# Patient Record
Sex: MALE | Race: WHITE | NOT HISPANIC OR LATINO | Employment: UNEMPLOYED | URBAN - METROPOLITAN AREA
[De-identification: names, ages, dates, MRNs, and addresses within clinical notes are randomized per-mention and may not be internally consistent; named-entity substitution may affect disease eponyms.]

---

## 2024-01-05 ENCOUNTER — LAB (OUTPATIENT)
Dept: LAB | Facility: LAB | Age: 19
End: 2024-01-05
Payer: COMMERCIAL

## 2024-01-05 ENCOUNTER — OFFICE VISIT (OUTPATIENT)
Dept: PEDIATRIC ENDOCRINOLOGY | Facility: CLINIC | Age: 19
End: 2024-01-05
Payer: COMMERCIAL

## 2024-01-05 VITALS
BODY MASS INDEX: 21.45 KG/M2 | HEART RATE: 82 BPM | HEIGHT: 74 IN | RESPIRATION RATE: 18 BRPM | SYSTOLIC BLOOD PRESSURE: 122 MMHG | DIASTOLIC BLOOD PRESSURE: 85 MMHG | WEIGHT: 167.11 LBS | OXYGEN SATURATION: 96 %

## 2024-01-05 DIAGNOSIS — E10.9 TYPE 1 DIABETES MELLITUS WITH HEMOGLOBIN A1C GOAL OF LESS THAN 7.0% (MULTI): ICD-10-CM

## 2024-01-05 LAB
ALBUMIN SERPL-MCNC: 4.2 G/DL (ref 3.5–5)
ALP BLD-CCNC: 141 U/L (ref 35–125)
ALT SERPL-CCNC: 90 U/L (ref 5–40)
ANION GAP SERPL CALC-SCNC: 11 MMOL/L
AST SERPL-CCNC: 65 U/L (ref 5–40)
BILIRUB SERPL-MCNC: 0.3 MG/DL (ref 0.1–1.2)
BUN SERPL-MCNC: 13 MG/DL (ref 8–25)
CALCIUM SERPL-MCNC: 9.5 MG/DL (ref 8.5–10.4)
CHLORIDE SERPL-SCNC: 101 MMOL/L (ref 97–107)
CHOLEST SERPL-MCNC: 184 MG/DL (ref 0–199)
CHOLESTEROL/HDL RATIO: 3.8
CO2 SERPL-SCNC: 29 MMOL/L (ref 24–31)
CREAT SERPL-MCNC: 0.7 MG/DL (ref 0.4–1.6)
EST. AVERAGE GLUCOSE BLD GHB EST-MCNC: 235 MG/DL
GFR SERPL CREATININE-BSD FRML MDRD: >90 ML/MIN/1.73M*2
GLUCOSE SERPL-MCNC: 241 MG/DL (ref 65–99)
HBA1C MFR BLD: 9.8 %
HDLC SERPL-MCNC: 48.6 MG/DL
NON-HDL CHOLESTEROL: 135 MG/DL (ref 0–119)
POC HEMOGLOBIN A1C: 9.4 % (ref 4.2–6.5)
POTASSIUM SERPL-SCNC: 4.4 MMOL/L (ref 3.4–5.1)
PROT SERPL-MCNC: 6.4 G/DL (ref 5.9–7.9)
SODIUM SERPL-SCNC: 141 MMOL/L (ref 133–145)
T4 FREE SERPL-MCNC: 1.4 NG/DL (ref 0.9–1.7)
TSH SERPL DL<=0.05 MIU/L-ACNC: 4.66 MIU/L (ref 0.27–4.2)

## 2024-01-05 PROCEDURE — 82465 ASSAY BLD/SERUM CHOLESTEROL: CPT

## 2024-01-05 PROCEDURE — 99215 OFFICE O/P EST HI 40 MIN: CPT | Performed by: PEDIATRICS

## 2024-01-05 PROCEDURE — 36415 COLL VENOUS BLD VENIPUNCTURE: CPT

## 2024-01-05 PROCEDURE — 83036 HEMOGLOBIN GLYCOSYLATED A1C: CPT

## 2024-01-05 PROCEDURE — 1036F TOBACCO NON-USER: CPT | Performed by: PEDIATRICS

## 2024-01-05 PROCEDURE — 3079F DIAST BP 80-89 MM HG: CPT | Performed by: PEDIATRICS

## 2024-01-05 PROCEDURE — 83718 ASSAY OF LIPOPROTEIN: CPT

## 2024-01-05 PROCEDURE — 83516 IMMUNOASSAY NONANTIBODY: CPT

## 2024-01-05 PROCEDURE — 80053 COMPREHEN METABOLIC PANEL: CPT

## 2024-01-05 PROCEDURE — 84439 ASSAY OF FREE THYROXINE: CPT

## 2024-01-05 PROCEDURE — 3074F SYST BP LT 130 MM HG: CPT | Performed by: PEDIATRICS

## 2024-01-05 PROCEDURE — 84443 ASSAY THYROID STIM HORMONE: CPT

## 2024-01-05 RX ORDER — INSULIN LISPRO 100 [IU]/ML
INJECTION, SOLUTION INTRAVENOUS; SUBCUTANEOUS
COMMUNITY
Start: 2019-01-16 | End: 2024-01-05 | Stop reason: WASHOUT

## 2024-01-05 RX ORDER — INSULIN ASPART 100 [IU]/ML
INJECTION, SOLUTION INTRAVENOUS; SUBCUTANEOUS
COMMUNITY
Start: 2022-07-12 | End: 2024-01-05 | Stop reason: CLARIF

## 2024-01-05 RX ORDER — INSULIN GLARGINE 100 [IU]/ML
INJECTION, SOLUTION SUBCUTANEOUS
Qty: 20 ML | Refills: 11 | Status: SHIPPED | OUTPATIENT
Start: 2024-01-05 | End: 2025-01-05

## 2024-01-05 RX ORDER — BLOOD SUGAR DIAGNOSTIC
STRIP MISCELLANEOUS
COMMUNITY
Start: 2020-10-09

## 2024-01-05 RX ORDER — INSULIN GLARGINE 100 [IU]/ML
INJECTION, SOLUTION SUBCUTANEOUS
COMMUNITY
End: 2024-01-05

## 2024-01-05 RX ORDER — IBUPROFEN 200 MG
TABLET ORAL
COMMUNITY
Start: 2016-09-14

## 2024-01-05 RX ORDER — BLOOD SUGAR DIAGNOSTIC
STRIP MISCELLANEOUS
COMMUNITY
Start: 2019-01-16 | End: 2024-01-05 | Stop reason: CLARIF

## 2024-01-05 RX ORDER — GLUCAGON INJECTION, SOLUTION 1 MG/.2ML
INJECTION, SOLUTION SUBCUTANEOUS
Qty: 0.2 ML | Refills: 2 | Status: SHIPPED | OUTPATIENT
Start: 2024-01-05 | End: 2024-01-10 | Stop reason: CLARIF

## 2024-01-05 RX ORDER — DEXTROSE 15 G/33 G
GEL IN PACKET (GRAM) ORAL
COMMUNITY
Start: 2016-09-14

## 2024-01-05 RX ORDER — BLOOD SUGAR DIAGNOSTIC
STRIP MISCELLANEOUS
COMMUNITY
Start: 2016-09-14

## 2024-01-05 RX ORDER — SYRINGE,SAFETY WITH NEEDLE,1ML 25GX1"
SYRINGE (EA) MISCELLANEOUS AS NEEDED
COMMUNITY
End: 2024-01-05 | Stop reason: SDUPTHER

## 2024-01-05 RX ORDER — INSULIN GLARGINE 100 [IU]/ML
INJECTION, SOLUTION SUBCUTANEOUS EVERY 24 HOURS
COMMUNITY
End: 2024-01-05 | Stop reason: SDUPTHER

## 2024-01-05 RX ORDER — SYRINGE,SAFETY WITH NEEDLE,1ML 25GX1"
SYRINGE (EA) MISCELLANEOUS
Qty: 150 EACH | Refills: 11 | Status: SHIPPED | OUTPATIENT
Start: 2024-01-05

## 2024-01-05 RX ORDER — INSULIN LISPRO 100 [IU]/ML
INJECTION, SOLUTION INTRAVENOUS; SUBCUTANEOUS
Qty: 40 ML | Refills: 11 | Status: SHIPPED | OUTPATIENT
Start: 2024-01-05 | End: 2025-01-05

## 2024-01-05 RX ORDER — INSULIN LISPRO 100 [IU]/ML
INJECTION, SOLUTION INTRAVENOUS; SUBCUTANEOUS
COMMUNITY
Start: 2016-09-14 | End: 2024-01-05 | Stop reason: WASHOUT

## 2024-01-05 RX ORDER — BLOOD SUGAR DIAGNOSTIC
STRIP MISCELLANEOUS
COMMUNITY
Start: 2018-05-11 | End: 2024-01-05 | Stop reason: CLARIF

## 2024-01-05 ASSESSMENT — PATIENT HEALTH QUESTIONNAIRE - PHQ9
2. FEELING DOWN, DEPRESSED OR HOPELESS: NOT AT ALL
SUM OF ALL RESPONSES TO PHQ9 QUESTIONS 1 & 2: 0
1. LITTLE INTEREST OR PLEASURE IN DOING THINGS: NOT AT ALL

## 2024-01-05 ASSESSMENT — PAIN SCALES - GENERAL: PAINLEVEL: 0-NO PAIN

## 2024-01-05 NOTE — PROGRESS NOTES
Subjective   Debra Fletcher is a 18 y.o. male with type 1 diabetes.   Today Debra presents to clinic with his mother.     HPI  Other Medical History:  Has been well    Manages diabetes with Multiple daily injections, fingersticks  Insulin Instructions  Fixed Dose Injections   Lantus Solostar U-100 Insulin 100 unit/mL (3 mL) insulin pen   Last edited by Liz Adler RN on 1/5/2024 at 2:04 PM      Time of Day Dose (units)   9 pm 25     Mealtime Injections   HumaLOG U-100 Insulin 100 unit/mL solution   Last edited by Liz Adler RN on 1/5/2024 at 2:04 PM      The patient will be instructed to take 0 units of insulin at the blood glucose target, and will dose in 1 unit increments.      Mealtime Carb Ratio (g/unit) Sensitivity Factor (mg/dL/unit) BG Target (mg/dL)   all 5 30 150           -TDD:135 units per patient  -Total daily basal: 25 units   -Basal %: 22  -BG average:  269  0.1 readings per day    Concerns at this visit:  Insurance, affording supplies    Social:  [employer] grad  Not employed presently-looking for a job w/ benefits  Plays [employer] and video games w/ friends      Screens:  Eye exam: > 1 year  Labs: 2020  Flu shot: Declines  Dentist: > 1 yr    Insulin Injections/Pump sites:  - Gives mealtime insulin during eating  - Site rotation: Legs, abd.    Carbohydrate counting:  - Patient states they are good at counting carbs  - Patient states they are good at adherence to bolusing for carbs    Other:  Hypoglyemia:  - uses juice, tabs  to treat lows  - treats with 10-15 gms carbs  - Nocturnal hypoglycemia? sometimes  Checks ketones with: illness, high BG    Education Reviewed:  Insurance, Gylcemic targets, Pump and CGM options, annual labs, hyperglycemia, hypoglycemia       Goals    None         Date of Diabetes Diagnosis: 09/13/16  Antibody Status at Diagnosis: positive  ED/Hospitalizations related to Diabetes: No  ED/Hospitalization not related to Diabetes: No  ED/Hospitalization related to DKA: No  Severe  "Hypoglycemia (coma, seizure, disorientation, or the need for high dose glucagon) since last visit: No         Review of Systems    Objective   /85   Pulse 82   Resp 18   Ht 1.877 m (6' 1.9\")   Wt 75.8 kg (167 lb 1.7 oz)   SpO2 96%   BMI 21.51 kg/m²      Lab  POC HEMOGLOBIN A1c   Date Value Ref Range Status   01/05/2024 9.4 (A) 4.2 - 6.5 % Final       Physical Exam   General: interactive in NAD  Injection/pump/sensor sites: no hypertrophies, no bruising or signs of infection or allergic reaction  Fundi:   Neck: No lymphadenopathy  Heart: no edema or cyanosis  Chest/Lungs: unlabored breathing   Abdomen: Soft, non-tender  Neuro: Grossly Intact  Extremities: normal,  Feet: normal   Thyroid: normal       Enlargement: not enlarged       Consistency: soft       Surface: smooth  Sexual Development: mature    Assessment/Plan   A 18 y.o. male with A0Skuibihe since 9/2016 treated with MDI and has not developed any diabetes complications thus far.   A1C is quite above the target. We have not see them for a while. Family is going through a divorce and struggling financially. Debra is unemployed, paying for insulin and diabetes supplies presents a financial challenge. Using Humalog vials because those are cheaper. Was on a pump in the past , can not afford this anymore.  BP is normal, weight is a bit down.  No glycemic data for review today. He is guesstimating his insulin doses and misses long acting insulin a few time a week.     Patient due for annual surveillance tests which were ordered.  Patient  needs to schedule an eye exam.    Topics reviewed:  - importance of prebolusing   - importance of daily long acting insulin  - family support and collaboration    Follow up in 3 mos           Plan  Check blood sugar 3-4 times per day  Increase Lantus to 30 units-check BG in the middle of the night for the first two nights after increase  Give 15 units Humalog per meal-alternatively-you can calculate 1:10 grams carbs, " and correction of 1:30>120  Have annual labs drawn  Schedule eye exam  Call office as needed-schedule follow up in 3 months-consider transition to Adult endocrinology this year     Insulin Instructions  Fixed Dose Injections   Lantus Solostar U-100 Insulin 100 unit/mL (3 mL) insulin pen   Last edited by Liz Adler, RN on 1/5/2024 at 2:05 PM      Time of Day Dose (units)   9 pm 30     Mealtime Injections   HumaLOG U-100 Insulin 100 unit/mL solution   Last edited by Liz Adler RN on 1/5/2024 at 2:07 PM      May use 1:10 grams carbs and 1:30>120 if you prefer to use instead of 15 units per meal      The patient will be instructed to take their fixed mealtime dose at the blood glucose target, and will dose in 1 unit increments.      Mealtime Fixed Mealtime Dose (units) Sensitivity Factor (mg/dL/unit) BG Target (mg/dL)   all 15

## 2024-01-05 NOTE — PATIENT INSTRUCTIONS
Nice to see you!  A1c was 9.4%.  Goal is < 7%.  We will work together to get closer to goal.    Plan:  Check blood sugar 3-4 times per day  Increase Lantus to 30 units-check BG in the middle of the night for the first two nights after increase  Give 15 units Humalog per meal-alternatively-you can calculate 1:10 grams carbs, and correction of 1:30>120  Have annual labs drawn  Schedule eye exam  Call office as needed-schedule follow up in 3 months-consider transition to Adult endocrinology this year

## 2024-01-08 LAB — TTG IGA SER IA-ACNC: NORMAL

## 2024-01-10 DIAGNOSIS — E10.9 CONTROLLED DIABETES MELLITUS TYPE 1 WITHOUT COMPLICATIONS (MULTI): ICD-10-CM

## 2024-01-10 LAB — SCAN RESULT: NORMAL

## 2024-01-10 RX ORDER — GLUCAGON 1 MG
1 VIAL (EA) INJECTION ONCE AS NEEDED
Qty: 1 EACH | Refills: 12 | Status: SHIPPED | OUTPATIENT
Start: 2024-01-10 | End: 2025-01-09

## 2024-04-19 ENCOUNTER — APPOINTMENT (OUTPATIENT)
Dept: PEDIATRIC ENDOCRINOLOGY | Facility: CLINIC | Age: 19
End: 2024-04-19
Payer: COMMERCIAL

## 2024-09-12 ENCOUNTER — SOCIAL WORK (OUTPATIENT)
Dept: PEDIATRIC ENDOCRINOLOGY | Facility: CLINIC | Age: 19
End: 2024-09-12
Payer: COMMERCIAL

## 2024-09-12 NOTE — PROGRESS NOTES
Referral received from Brneda Hu. Debra was last seen by pediatric endocrinology January 2024 where financial concerns were expressed as he was unemployed. Upcoming appointment with Rosamaria Robbins on 10/4. RISHI left voice mail for Debra ([phone]) requesting return call, will explore barriers and offer support.   09/12/24 at 12:39 PM - SHAYY Pro

## 2024-10-04 ENCOUNTER — LAB (OUTPATIENT)
Dept: LAB | Facility: LAB | Age: 19
End: 2024-10-04
Payer: COMMERCIAL

## 2024-10-04 ENCOUNTER — OFFICE VISIT (OUTPATIENT)
Dept: PEDIATRIC ENDOCRINOLOGY | Facility: CLINIC | Age: 19
End: 2024-10-04
Payer: COMMERCIAL

## 2024-10-04 VITALS
BODY MASS INDEX: 21.9 KG/M2 | SYSTOLIC BLOOD PRESSURE: 116 MMHG | DIASTOLIC BLOOD PRESSURE: 80 MMHG | HEIGHT: 74 IN | WEIGHT: 170.64 LBS | HEART RATE: 83 BPM

## 2024-10-04 DIAGNOSIS — R79.89 LFT ELEVATION: ICD-10-CM

## 2024-10-04 DIAGNOSIS — R76.8 ELEVATED ANTI-TISSUE TRANSGLUTAMINASE (TTG) IGA LEVEL: ICD-10-CM

## 2024-10-04 DIAGNOSIS — E10.9 TYPE 1 DIABETES MELLITUS WITH HEMOGLOBIN A1C GOAL OF LESS THAN 7.0% (MULTI): ICD-10-CM

## 2024-10-04 DIAGNOSIS — E10.9 TYPE 1 DIABETES MELLITUS WITH HEMOGLOBIN A1C GOAL OF LESS THAN 7.0% (MULTI): Primary | ICD-10-CM

## 2024-10-04 DIAGNOSIS — R89.9 ABNORMAL LABORATORY TEST: ICD-10-CM

## 2024-10-04 LAB
ALBUMIN SERPL BCP-MCNC: 4.4 G/DL (ref 3.4–5)
ALP SERPL-CCNC: 120 U/L (ref 33–120)
ALT SERPL W P-5'-P-CCNC: 118 U/L (ref 10–52)
ANION GAP SERPL CALCULATED.3IONS-SCNC: 15 MMOL/L (ref 10–20)
AST SERPL W P-5'-P-CCNC: 92 U/L (ref 9–39)
BILIRUB SERPL-MCNC: 0.5 MG/DL (ref 0–1.2)
BUN SERPL-MCNC: 13 MG/DL (ref 6–23)
CALCIUM SERPL-MCNC: 9.5 MG/DL (ref 8.6–10.3)
CHLORIDE SERPL-SCNC: 101 MMOL/L (ref 98–107)
CO2 SERPL-SCNC: 29 MMOL/L (ref 21–32)
CREAT SERPL-MCNC: 0.74 MG/DL (ref 0.5–1.3)
CREAT UR-MCNC: 260.1 MG/DL (ref 20–370)
EGFRCR SERPLBLD CKD-EPI 2021: >90 ML/MIN/1.73M*2
GLUCOSE SERPL-MCNC: 46 MG/DL (ref 74–99)
MICROALBUMIN UR-MCNC: 26.2 MG/L
MICROALBUMIN/CREAT UR: 10.1 UG/MG CREAT
POC HEMOGLOBIN A1C: 9.6 % (ref 4.2–6.5)
POTASSIUM SERPL-SCNC: 3.7 MMOL/L (ref 3.5–5.3)
PROT SERPL-MCNC: 7 G/DL (ref 6.4–8.2)
SODIUM SERPL-SCNC: 141 MMOL/L (ref 136–145)
T4 FREE SERPL-MCNC: 1.03 NG/DL (ref 0.61–1.12)
TSH SERPL-ACNC: 3.12 MIU/L (ref 0.44–3.98)

## 2024-10-04 PROCEDURE — 3061F NEG MICROALBUMINURIA REV: CPT | Performed by: PEDIATRICS

## 2024-10-04 PROCEDURE — 36415 COLL VENOUS BLD VENIPUNCTURE: CPT

## 2024-10-04 PROCEDURE — 3074F SYST BP LT 130 MM HG: CPT | Performed by: PEDIATRICS

## 2024-10-04 PROCEDURE — 82570 ASSAY OF URINE CREATININE: CPT

## 2024-10-04 PROCEDURE — 99214 OFFICE O/P EST MOD 30 MIN: CPT | Performed by: PEDIATRICS

## 2024-10-04 PROCEDURE — 90656 IIV3 VACC NO PRSV 0.5 ML IM: CPT | Performed by: PEDIATRICS

## 2024-10-04 PROCEDURE — 83036 HEMOGLOBIN GLYCOSYLATED A1C: CPT

## 2024-10-04 PROCEDURE — 83036 HEMOGLOBIN GLYCOSYLATED A1C: CPT | Mod: QW | Performed by: PEDIATRICS

## 2024-10-04 PROCEDURE — 86800 THYROGLOBULIN ANTIBODY: CPT

## 2024-10-04 PROCEDURE — 3008F BODY MASS INDEX DOCD: CPT | Performed by: PEDIATRICS

## 2024-10-04 PROCEDURE — 3046F HEMOGLOBIN A1C LEVEL >9.0%: CPT | Performed by: PEDIATRICS

## 2024-10-04 PROCEDURE — 80053 COMPREHEN METABOLIC PANEL: CPT

## 2024-10-04 PROCEDURE — 84443 ASSAY THYROID STIM HORMONE: CPT

## 2024-10-04 PROCEDURE — 3079F DIAST BP 80-89 MM HG: CPT | Performed by: PEDIATRICS

## 2024-10-04 PROCEDURE — 86376 MICROSOMAL ANTIBODY EACH: CPT

## 2024-10-04 PROCEDURE — 83516 IMMUNOASSAY NONANTIBODY: CPT

## 2024-10-04 PROCEDURE — 82043 UR ALBUMIN QUANTITATIVE: CPT

## 2024-10-04 PROCEDURE — 84439 ASSAY OF FREE THYROXINE: CPT

## 2024-10-04 RX ORDER — INSULIN GLARGINE 100 [IU]/ML
INJECTION, SOLUTION SUBCUTANEOUS
Qty: 20 ML | Refills: 11 | Status: SHIPPED | OUTPATIENT
Start: 2024-10-04 | End: 2025-10-05

## 2024-10-04 RX ORDER — INSULIN LISPRO 100 [IU]/ML
INJECTION, SOLUTION INTRAVENOUS; SUBCUTANEOUS
Qty: 40 ML | Refills: 11 | Status: SHIPPED | OUTPATIENT
Start: 2024-10-04 | End: 2025-10-05

## 2024-10-04 RX ORDER — BLOOD-GLUCOSE SENSOR
EACH MISCELLANEOUS
Qty: 3 EACH | Refills: 11 | Status: SHIPPED | OUTPATIENT
Start: 2024-10-04

## 2024-10-04 NOTE — PROGRESS NOTES
"Subjective   Debra Fletcher is a 19 y.o. male with type 1 diabetes.   Today Debra presents to clinic with his grandmother.     HPI  Other Medical History:      Manages diabetes with MDI, rare glucose testing.  Dexcom G7 started in office today.  Willing to give it a try.    Current insulin Instructions  Fixed Dose Injections   insulin glargine 100 unit/mL injection (Lantus)   Last edited by Rosamaria Robbins RN on 10/4/2024 at 1:15 PM      Time of Day Dose (units)   9 pm 30     Mealtime Injections   HumaLOG U-100 Insulin 100 unit/mL solution   Last edited by Rosamaria Robbins RN on 10/4/2024 at 1:17 PM      May use 1:10 grams carbs and 1:20>150 if you prefer to use instead of 15 units per meal      Patient is instructed to take their fixed mealtime dose plus an additional correction dose, rounded down to the nearest multiple of 1 unit.      Mealtime Fixed Mealtime Dose (units) Sensitivity Factor (mg/dL/unit) BG Target (mg/dL)   all 15           -TDD: taking at least 70 units Humalog daily, 100 units total  -Total daily basal: 30   -Basal %: 30  -BG average: 177-- one test in meter   -CGM wear time (%): n/a    Family history:  Grandparents with hypothyroidism  GF with RA  No GI, no celiac, no liver disease in the family     Concerns at this visit:  having trouble sleeping, doesn't snore. Up late (bed at 3am) up at noon or 2pm.  Work shift is \"closing\" working noon till either 6p or 10pm, planning to work on sleep hygiene and will let us know if does not get better    Spoke with our RISHI Perrin a few weeks ago, mentioned some financial concerns--given [facility] form, he is planning to return    Social: working at [employer]     Screens:  Eye exam: 7/2024  Labs: 1/2024   Flu shot: today  Depression screen: n/a             Insulin Injections/Pump sites:   - Gives mealtime insulin before during and after eating.  Denies missed doses  - Site rotation: arms, legs, stomach (states is puffy)     Carbohydrate counting:   - " "Patient states they are good at counting carbs.  - Patient states they are good at adherence to bolusing for carbs.     Other:   Hypoglycemia:  - uses anything available, prefers juice to treat lows  - treats with 15 gms carbs  - Nocturnal hypoglycemia: no  Checks ketones with: checks if \"high\" or if ill     Exercise: walking at work     Education Reviewed:      Goals         check glucose more (pt-stated)             Date of Diabetes Diagnosis: 09/13/16  Antibody Status at Diagnosis: positive  Hypoglycemia Unawareness : No  ED/Hospitalizations related to Diabetes: No  ED/Hospitalization not related to Diabetes: No  ED/Hospitalization related to DKA: No  Severe Hypoglycemia (coma, seizure, disorientation, or the need for high dose glucagon) since last visit: No         Review of Systems   Constitutional:  Negative for activity change.   Respiratory:  Negative for shortness of breath.    Gastrointestinal:  Negative for abdominal pain, constipation, diarrhea, nausea and vomiting.   Endocrine: Negative for cold intolerance and heat intolerance.   Musculoskeletal:  Negative for gait problem.   Neurological:  Negative for weakness.       Objective   /80   Pulse 83   Ht 1.87 m (6' 1.62\")   Wt 77.4 kg (170 lb 10.2 oz)   BMI 22.13 kg/m²      Physical Exam  Constitutional:       Appearance: Normal appearance.   HENT:      Head: Normocephalic.   Eyes:      Extraocular Movements: Extraocular movements intact.      Conjunctiva/sclera: Conjunctivae normal.   Neck:      Thyroid: No thyromegaly.   Cardiovascular:      Rate and Rhythm: Normal rate and regular rhythm.   Pulmonary:      Effort: Pulmonary effort is normal.      Breath sounds: Normal breath sounds.   Abdominal:      Palpations: Abdomen is soft.   Musculoskeletal:         General: Normal range of motion.   Skin:     General: Skin is warm and dry.   Neurological:      General: No focal deficit present.      Mental Status: He is alert and oriented to person, " place, and time.   Psychiatric:         Mood and Affect: Mood normal.         Behavior: Behavior normal.          Lab  Lab Results   Component Value Date    HGBA1C 9.6 (A) 10/04/2024    HGBA1C 9.8 (H) 01/05/2024    HGBA1C 9.4 (A) 01/05/2024    HGBA1C 7.7 10/09/2020     Glucose Monitoring: rarely testing, had one check on his meter    Assessment/Plan   Debra Fletcher is a 19 y.o. male with diabetes      Plan:      Type 1 diabetes mellitus with hemoglobin A1c goal of less than 7.0% (Multi)  HbA1c 9.6%   Started Dexcom today  Discussed AID/pump technology, Debra was interested, sent message to help sign up for prepump class  Discussed to touch base next week so can make adjustments    Abnormal laboratory test  -     Thyroid Peroxidase (TPO) Antibody; Future  -     Tissue Transglutaminase IgA; Future  -     Comprehensive Metabolic Panel; Future  -     Thyroid Stimulating Hormone; Future  -     Thyroxine, Free; Future  Elevated anti-tissue transglutaminase (tTG) IgA level  -     Referral to Gastroenterology; Future  LFT elevation  -     Referral to Gastroenterology; Future    Celiac screen was positive (see above), sent referral to GI, and plan to repeat TTg at this visit to see trend  In addition LFTs were mildly elevated at last check ,repeated to see trend, this was included in the referral reason to GI  TSH just above RR, FT4 normal, will repeat with these labs and get thyroid antibodies     Discussed with Debra and his GM, that if he doesn't hear about the referral, given information for central scheduling for them to call GI to help make the appointment    Will see back in follow-up in 3 months    -     POCT glycosylated hemoglobin (Hb A1C) manually resulted  -     HumaLOG U-100 Insulin 100 unit/mL injection; Use up to 110 units per day as directed  -     insulin glargine (Lantus U-100 Insulin) 100 unit/mL injection; Inject up to 50 units daily as directed  -     blood-glucose sensor (Dexcom G7 Sensor) device;  Apply 1 sensor every 10 days to monitor glucose  -     Flu vaccine, trivalent, preservative free, age 6 months and greater (Fluraix/Fluzone/Flulaval)  -     Albumin-Creatinine Ratio, Urine Random; Future  -     Anti-Thyroglobulin Antibody; Future         Insulin Instructions  Fixed Dose Injections   insulin glargine 100 unit/mL injection (Lantus)   Last edited by Rosamaria Robbins RN on 10/4/2024 at 1:15 PM      Time of Day Dose (units)   9 pm 30     Mealtime Injections   HumaLOG U-100 Insulin 100 unit/mL solution   Last edited by Rosamaria Robbins RN on 10/4/2024 at 1:17 PM      May use 1:10 grams carbs and 1:20>150 if you prefer to use instead of 15 units per meal      Patient is instructed to take their fixed mealtime dose plus an additional correction dose, rounded down to the nearest multiple of 1 unit.      Mealtime Fixed Mealtime Dose (units) Sensitivity Factor (mg/dL/unit) BG Target (mg/dL)   all 15       On the day of the visit I spent 30 minutes in the care of the patient in reviewing the patient's prior history, prior documentation, labs, preparing to see the patient, performing the exam, counseling and providing education to the patient/family/care giver about plan, ordering labs, medications, documenting the encounter

## 2024-10-04 NOTE — PATIENT INSTRUCTIONS
It was good to see you today!    Work on correcting your glucoses more often now that you are wearing the Dexcom again.  If you don't wear the Dexcom, test your blood sugar 4-6 times daily.    Get your blood drawn today.    adult  If you do not hear from them for [phone]      I would make the appointment with adult endocrinology for your follow-up visit  Referral was placed, contact information for [phone]   If unable to get in, please contact us         Return to clinic in 2 months to see either nurse Liz Adler or Rosamaria Robbins    [phone] weekdays 830-5pm  [phone] weekends or after 5pm weekdays   [email or web address]

## 2024-10-05 LAB
THYROPEROXIDASE AB SERPL-ACNC: >1000 IU/ML
TTG IGA SER IA-ACNC: 60.1 U/ML

## 2024-10-07 LAB — THYROGLOB AB SERPL-ACNC: <0.9 IU/ML (ref 0–4)

## 2024-10-14 ASSESSMENT — ENCOUNTER SYMPTOMS
ABDOMINAL PAIN: 0
SHORTNESS OF BREATH: 0
NAUSEA: 0
CONSTIPATION: 0
WEAKNESS: 0
ACTIVITY CHANGE: 0
VOMITING: 0
DIARRHEA: 0

## 2024-10-16 ENCOUNTER — TELEPHONE (OUTPATIENT)
Dept: PEDIATRIC ENDOCRINOLOGY | Facility: CLINIC | Age: 19
End: 2024-10-16
Payer: COMMERCIAL

## 2024-10-16 NOTE — TELEPHONE ENCOUNTER
Tried to reach out, it went to . Left message to call back about lab results  At visit had already placed referral to GI, updated Debra that referred to GI due to concern for celiac and for elevated LFTs and given phone number for scheduling if they do not hear.   As of now has not made appointment. Put in another referral.   Is not in mychart, we will mail a letter asking them to call us about lab results.

## 2024-12-20 ENCOUNTER — APPOINTMENT (OUTPATIENT)
Dept: PEDIATRIC ENDOCRINOLOGY | Facility: CLINIC | Age: 19
End: 2024-12-20
Payer: COMMERCIAL

## 2025-02-05 DIAGNOSIS — E10.9 TYPE 1 DIABETES MELLITUS WITH HEMOGLOBIN A1C GOAL OF LESS THAN 7.0% (MULTI): ICD-10-CM

## 2025-02-05 RX ORDER — INSULIN GLARGINE 100 [IU]/ML
INJECTION, SOLUTION SUBCUTANEOUS
Qty: 20 ML | Refills: 11 | Status: SHIPPED | OUTPATIENT
Start: 2025-02-05 | End: 2026-02-06

## 2025-02-05 RX ORDER — INSULIN LISPRO 100 [IU]/ML
INJECTION, SOLUTION INTRAVENOUS; SUBCUTANEOUS
Qty: 40 ML | Refills: 11 | Status: SHIPPED | OUTPATIENT
Start: 2025-02-05 | End: 2026-02-06

## 2025-02-21 ENCOUNTER — APPOINTMENT (OUTPATIENT)
Dept: PEDIATRIC ENDOCRINOLOGY | Facility: CLINIC | Age: 20
End: 2025-02-21
Payer: COMMERCIAL

## 2025-02-21 VITALS
BODY MASS INDEX: 22.78 KG/M2 | HEART RATE: 82 BPM | SYSTOLIC BLOOD PRESSURE: 122 MMHG | WEIGHT: 177.47 LBS | OXYGEN SATURATION: 100 % | DIASTOLIC BLOOD PRESSURE: 82 MMHG | RESPIRATION RATE: 16 BRPM | HEIGHT: 74 IN

## 2025-02-21 DIAGNOSIS — R79.89 LFT ELEVATION: Primary | ICD-10-CM

## 2025-02-21 DIAGNOSIS — R76.8 ELEVATED ANTI-TISSUE TRANSGLUTAMINASE (TTG) IGA LEVEL: ICD-10-CM

## 2025-02-21 DIAGNOSIS — E10.9 TYPE 1 DIABETES MELLITUS WITH HEMOGLOBIN A1C GOAL OF LESS THAN 7.0% (MULTI): ICD-10-CM

## 2025-02-21 LAB — POC HEMOGLOBIN A1C: 9 % (ref 4.2–6.5)

## 2025-02-21 PROCEDURE — 3008F BODY MASS INDEX DOCD: CPT | Performed by: PEDIATRICS

## 2025-02-21 PROCEDURE — 3074F SYST BP LT 130 MM HG: CPT | Performed by: PEDIATRICS

## 2025-02-21 PROCEDURE — 83036 HEMOGLOBIN GLYCOSYLATED A1C: CPT

## 2025-02-21 PROCEDURE — 3079F DIAST BP 80-89 MM HG: CPT | Performed by: PEDIATRICS

## 2025-02-21 PROCEDURE — 99214 OFFICE O/P EST MOD 30 MIN: CPT | Performed by: PEDIATRICS

## 2025-02-21 PROCEDURE — 95251 CONT GLUC MNTR ANALYSIS I&R: CPT | Performed by: PEDIATRICS

## 2025-02-21 PROCEDURE — 83036 HEMOGLOBIN GLYCOSYLATED A1C: CPT | Mod: QW | Performed by: PEDIATRICS

## 2025-02-21 ASSESSMENT — ENCOUNTER SYMPTOMS
WEAKNESS: 0
SHORTNESS OF BREATH: 0
ACTIVITY CHANGE: 0
NAUSEA: 0
VOMITING: 0
DIARRHEA: 0
CONSTIPATION: 0

## 2025-02-21 ASSESSMENT — PATIENT HEALTH QUESTIONNAIRE - PHQ9
SUM OF ALL RESPONSES TO PHQ9 QUESTIONS 1 & 2: 0
1. LITTLE INTEREST OR PLEASURE IN DOING THINGS: NOT AT ALL
2. FEELING DOWN, DEPRESSED OR HOPELESS: NOT AT ALL

## 2025-02-21 NOTE — PROGRESS NOTES
[facility]  [facility]    Subjective   Debra Fletcher is a 19 y.o. male with type 1 diabetes.   Today Debra presents to clinic with his mother.     HPI  Other Medical History:   Annual labs 10/2024 +TPO, elevated LFTs, elevated TTG    Manages diabetes with MDI, Dexcom G7 but not currently wearing due to financial constraints. Last date of data 2/12/225   Insulin Instructions  Fixed Dose Injections   Lantus U-100 Insulin 100 unit/mL solution   Last edited by Rosamaria Robbins, RN on 2/21/2025 at 8:14 AM      Time of Day Dose (units)   9 pm 30     Mealtime Injections 1   HumaLOG U-100 Insulin 100 unit/mL solution   Last edited by Rosamaria Robbins RN on 2/21/2025 at 8:15 AM      For glucose corrections, patient is instructed to round their insulin dose down to the nearest multiple of 0.5 units.      Mealtime Carb Ratio (g/unit) Sensitivity Factor (mg/dL/unit) BG Target (mg/dL)   all meals 10 30 170      Concerns at this visit: denies concerns  States wearing Dexcom is helping.  Would like to transition to insulin pump, but is cost prohibitive right now.  Will give H form today--if qualifies may help with cost     Social: [employer]  Severity measure for depression (PHQ9 for Adolescents-Adapted) Total or Prorated Raw Score= Patient Health Questionnaire-2 Score: 0 (2/21/2025  8:24 AM) No data recorded     Severity of Depressive disorder or episode falls under the following category, with plan:  None (0): Will follow up with PHQ-A in one year       Insulin Injections/Pump sites:   - Gives mealtime insulin before during and after eating.  - Site rotation: stomach and legs     Carbohydrate counting:   - Patient states they are good at counting carbs.  - Patient states they are good at adherence to bolusing for carbs.     Other:   Hypoglycemia:  - uses juice or anything available to treat lows  - treats with 15 gms carbs  - Nocturnal hypoglycemia: yes--going to bed around  6a, sometimes low after humalog at bedtime  Checks ketones with: none, checks if prolonged hyperglycemia     Exercise:      Education Reviewed:      Goals         check glucose more (pt-stated)             Diabetes  Date of Diabetes Diagnosis: 09/13/16  Antibody status at diagnosis: positive  Type of Diabetes: Type 1    Insulin Delivery  Diabetes Management Regimen: MDI  Using Smart Pen device: No    Glucose Monitoring  How do you primarily monitor blood sugars?: CGM  CGM Type: Dexcom G7  Time in range 70-180mg/dL (%): 17  Time low <70mg/dL (%): 2  Time high >180mg/dL (%): 81  Average Glucose: 286  CGM download reviewed on clinic visit day  Start Date of recording 1/30/2025 End Date of recording 2/12/2025  Days of data reviewed on today's download 14 days   Interpretation:  Will take a correction dose before he goes to sleep, and he will drop low afterwards  Appears to be high after carb dose, and then BG comes down after correction dose    Clinical Details  Hypoglycemia Unawareness : No  Severe Hypoglycemia (coma, seizure, disorientation, or the need for high dose glucagon) since last visit: No    Hospitalizations (since last endocrine appointment)  ED/Hospitalizations related to Diabetes: No  ED/Hospitalization not related to Diabetes: No  ED/Hospitalization related to DKA: No    Education  Comprehensive Diabetes Education : 09/13/16    Screens  Labs: 10/04/24  Eye Exam: Yes  Eye Exam Date: 11/15/24  Flu Shot: Yes  Flu Shot Date: 10/04/24  Depression Screen: Yes  Depression Screen Date: 02/21/25         Review of Systems   Constitutional:  Negative for activity change.   Respiratory:  Negative for shortness of breath.    Gastrointestinal:  Negative for constipation, diarrhea, nausea and vomiting.   Endocrine: Negative for cold intolerance and heat intolerance.   Musculoskeletal:  Negative for gait problem.   Neurological:  Negative for weakness.   Denies any abdominal pain, no diarrhea, no constipation, no weight  "loss, no jaundice    Objective   /82   Pulse 82   Resp 16   Ht 1.87 m (6' 1.62\")   Wt 80.5 kg (177 lb 7.5 oz)   SpO2 100%   BMI 23.02 kg/m²      Physical Exam  Constitutional:       Appearance: Normal appearance.   HENT:      Head: Normocephalic.   Eyes:      Extraocular Movements: Extraocular movements intact.      Conjunctiva/sclera: Conjunctivae normal.   Neck:      Thyroid: No thyromegaly.   Cardiovascular:      Rate and Rhythm: Normal rate and regular rhythm.   Pulmonary:      Effort: Pulmonary effort is normal.      Breath sounds: Normal breath sounds.   Abdominal:      Tenderness: There is no abdominal tenderness.   Musculoskeletal:         General: Normal range of motion.   Skin:     General: Skin is warm and dry.      Coloration: Skin is not jaundiced.   Neurological:      General: No focal deficit present.      Mental Status: He is alert and oriented to person, place, and time.   Psychiatric:         Mood and Affect: Mood normal.         Behavior: Behavior normal.            Lab  Lab Results   Component Value Date    HGBA1C 9.0 (A) 02/21/2025    HGBA1C 9.6 (A) 10/04/2024    HGBA1C 9.8 (H) 01/05/2024    HGBA1C 9.4 (A) 01/05/2024        Office Visit on 02/21/2025   Component Date Value Ref Range Status    POC HEMOGLOBIN A1c 02/21/2025 9.0 (A)  4.2 - 6.5 % Final   Lab on 10/04/2024   Component Date Value Ref Range Status    Thyroid Peroxidase (TPO) Antibody 10/04/2024 >1,000 (H)  <=60 IU/mL Final    Tissue Transglutaminase, IgA 10/04/2024 60.1 (H)  <15.0 U/mL Final    Presence of one or more of Tissue Transglutaminase antibodies  (TTG IgA/G) or Deamidated Gliadin antibodies (DGP IgA/G) is   suggestive of celiac disease.  In isolation, a positive TTG   IgA/G or DGP IgA/G test is not diagnostic of celiac disease.  Higher antibody titers are more strongly associated with a  true positive result. Additional assessment by alternate  antibodies or duodenal biopsy are needed to complete the  diagnostic " evaluation.      Glucose 10/04/2024 46 (L)  74 - 99 mg/dL Final    Sodium 10/04/2024 141  136 - 145 mmol/L Final    Potassium 10/04/2024 3.7  3.5 - 5.3 mmol/L Final    Chloride 10/04/2024 101  98 - 107 mmol/L Final    Bicarbonate 10/04/2024 29  21 - 32 mmol/L Final    Anion Gap 10/04/2024 15  10 - 20 mmol/L Final    Urea Nitrogen 10/04/2024 13  6 - 23 mg/dL Final    Creatinine 10/04/2024 0.74  0.50 - 1.30 mg/dL Final    eGFR 10/04/2024 >90  >60 mL/min/1.73m*2 Final    Calculations of estimated GFR are performed using the 2021 CKD-EPI Study Refit equation without the race variable for the IDMS-Traceable creatinine methods.  https://jasn.asnjournals.org/content/early/2021/09/22/ASN.0695661816    Calcium 10/04/2024 9.5  8.6 - 10.3 mg/dL Final    Albumin 10/04/2024 4.4  3.4 - 5.0 g/dL Final    Alkaline Phosphatase 10/04/2024 120  33 - 120 U/L Final    Total Protein 10/04/2024 7.0  6.4 - 8.2 g/dL Final    AST 10/04/2024 92 (H)  9 - 39 U/L Final    Bilirubin, Total 10/04/2024 0.5  0.0 - 1.2 mg/dL Final    ALT 10/04/2024 118 (H)  10 - 52 U/L Final    Patients treated with Sulfasalazine may generate falsely decreased results for ALT.    Thyroid Stimulating Hormone 10/04/2024 3.12  0.44 - 3.98 mIU/L Final    Thyroxine, Free 10/04/2024 1.03  0.61 - 1.12 ng/dL Final    Anti-Thyroglobulin AB 10/04/2024 <0.9  0.0 - 4.0 IU/mL Final    INTERPRETIVE INFORMATION: Thyroglobulin Antibody                                    A value of 4.0 IU/mL or less indicates a negative result for   thyroglobulin antibodies.    The Thyroglobulin Antibody assay is being performed using the   [employer] Access DxI method.  Performed By: [facility] [address] [address]  : Trino Veliz MD, PhD  CLIA Number: [id number]    Albumin, Urine Random 10/04/2024 26.2  Not established mg/L Final    Creatinine, Urine Random 10/04/2024 260.1  20.0 - 370.0 mg/dL Final    Albumin/Creatinine Ratio 10/04/2024  10.1  <30.0 ug/mg Creat Final   Office Visit on 10/04/2024   Component Date Value Ref Range Status    POC HEMOGLOBIN A1c 10/04/2024 9.6 (A)  4.2 - 6.5 % Final        Assessment/Plan   raffy Fletcher is a 19 y.o. male with type 1 diabetes.      Plan:      Type 1 diabetes mellitus with hemoglobin A1c goal of less than 7.0% (Multi)  HbA1c 9% which is above ADA goal  Reviewed/interpreted CGM report, as detailed above, changes as detailed below. We are currently managing the insulin, the dose was adjusted with the goal of improving overall time in range (glycemic control).    -adjuated carb ratio to 1:9 (from 1:10), and reviewed example calculations  -discussed that if giving correction dose prior to going to sleep, as typically giving about 10 units, to give 2 units less, to avoid the low when he was sleeping  -encouraged to dose before eating  -discussed pumps/AID systems, but at this time they are cost prohibitive,  given [facility] form today--if qualifies may help with cost    +positive TPO, normal TSH at last check (10/2024), denies any symptoms of thyroid dysfunction, given lab order for repeat monitoring TSH/FT4 so can be drawn when he sees GI and gets any labwork with them.     Persistent LFT elevation  Elevated anti-tissue transglutaminase (tTG) IgA level - concern for celiac  -had referred to GI, but Debra has not had a chance to make the appointment  -placed new referral, discussed importance of seeing them as LFTs have remained persistently elevated, and given number to call if they do not hear about scheduling.  -Debra in agreement with plan     -     POCT glycosylated hemoglobin (Hb A1C) manually resulted  -     Follow Up In Pediatric Endocrinology; Future  -     Referral to Gastroenterology; Future  -     Thyroid Stimulating Hormone; Future  -     Thyroxine, Free; Future    Will see back in follow-up in 3 months, discussed if noting trends of highs or lows to send in BGs in the interim for continued  adjustments       Insulin Instructions  Fixed Dose Injections   Lantus U-100 Insulin 100 unit/mL solution   Last edited by Rosamaria Robbins RN on 2/21/2025 at 8:14 AM      Time of Day Dose (units)   9 pm 30     Mealtime Injections 1   HumaLOG U-100 Insulin 100 unit/mL solution   Last edited by Edel Anderson MD on 2/21/2025 at 10:17 AM      Before bed:  decrease your humalog/lispro by 2 units (give a little less)      For glucose corrections, patient is instructed to round their insulin dose down to the nearest multiple of 0.5 units.      Mealtime Carb Ratio (g/unit) Sensitivity Factor (mg/dL/unit) BG Target (mg/dL)   all meals 9 30 170       CGM Interpretation/Plan  CGM download was reviewed in detail as documented above under GLUCOSE MONITORING and will be attached to chart.  A minimum of 72 hours of glucose data was used to inform the management plan outlined above.

## 2025-02-21 NOTE — PATIENT INSTRUCTIONS
It was good to see you today!  Your A1c has decreased to 9%--good job!  Work on:  Taking your Humalog/lispro before eating (even better would be 10-15 minutes before).  Keep Dexcom on    Adjust your carb ratio to 1 unit per 9g carb  Before bed:  decrease your humalog/lispro by 2 units (give a little less)    Placed referral to adult GI:  If you do not hear from them for [phone]     Return to clinic in 3 months to see nurse Rosamaria Robbins RN    [phone] weekdays 830-5pm  [phone] weekends or after 5pm weekdays   [email or web address]     Insulin Instructions  Fixed Dose Injections   Lantus U-100 Insulin 100 unit/mL solution         Time of Day Dose (units)   9 pm 30                    Humalog   Mealtime Carb Ratio (g/unit) Sensitivity Factor (mg/dL/unit) BG Target (mg/dL)   all meals 9 30 170